# Patient Record
Sex: FEMALE | URBAN - METROPOLITAN AREA
[De-identification: names, ages, dates, MRNs, and addresses within clinical notes are randomized per-mention and may not be internally consistent; named-entity substitution may affect disease eponyms.]

---

## 2024-04-09 ENCOUNTER — OV CON (OUTPATIENT)
Dept: URBAN - METROPOLITAN AREA CLINIC 19 | Facility: CLINIC | Age: 44
End: 2024-04-09

## 2024-07-19 ENCOUNTER — OFFICE VISIT (OUTPATIENT)
Dept: URBAN - METROPOLITAN AREA CLINIC 19 | Facility: CLINIC | Age: 44
End: 2024-07-19
Payer: COMMERCIAL

## 2024-07-19 ENCOUNTER — DASHBOARD ENCOUNTERS (OUTPATIENT)
Age: 44
End: 2024-07-19

## 2024-07-19 VITALS
HEIGHT: 64 IN | DIASTOLIC BLOOD PRESSURE: 96 MMHG | WEIGHT: 121.8 LBS | HEART RATE: 99 BPM | SYSTOLIC BLOOD PRESSURE: 130 MMHG | TEMPERATURE: 98.6 F | BODY MASS INDEX: 20.79 KG/M2

## 2024-07-19 DIAGNOSIS — R10.13 EPIGASTRIC PAIN: ICD-10-CM

## 2024-07-19 DIAGNOSIS — K92.1 BLACK STOOL: ICD-10-CM

## 2024-07-19 DIAGNOSIS — R63.0 LOSS OF APPETITE: ICD-10-CM

## 2024-07-19 DIAGNOSIS — R11.10 VOMITING: ICD-10-CM

## 2024-07-19 PROCEDURE — 99244 OFF/OP CNSLTJ NEW/EST MOD 40: CPT | Performed by: NURSE PRACTITIONER

## 2024-07-19 PROCEDURE — 99204 OFFICE O/P NEW MOD 45 MIN: CPT | Performed by: NURSE PRACTITIONER

## 2024-07-19 RX ORDER — PANTOPRAZOLE SODIUM 40 MG/1
1 TABLET TABLET, DELAYED RELEASE ORAL ONCE A DAY
Qty: 90 TABLET | Refills: 0 | OUTPATIENT
Start: 2024-07-19

## 2024-07-19 RX ORDER — ONDANSETRON 4 MG/1
1 TABLET ON THE TONGUE AND ALLOW TO DISSOLVE TABLET, ORALLY DISINTEGRATING ORAL TWICE A DAY
Qty: 60 TABLET | Refills: 1 | OUTPATIENT
Start: 2024-07-19

## 2024-07-19 NOTE — HPI-TODAY'S VISIT:
42 yo female with PMH of asthma, ADHD, anxiety, alcohol abuse presents for abdominal pain, loss of appetite.  Sent upon referral from Judy Kidd NP. A copy of this report will be sent to the referring provider. Referred to Dr. Stephens   Presented to ER 6/29/2024 with chest pain. Reported vomiting and felt lightheaded like she was going to pass out along with chest pain. She reported last alcohol was day prior, red wine and vomited that up. Denies cardiac history.  reported she has withdrawal symptoms like tremors when she does not drink alcohol.   She reported drinking 3 glasses of wine a day WBC 14.4, Hg 14.8, Plt 177, low sodium 134, K 3.3, T bili 1.0, , AST 74, ALT 41, lipase 92, negative troponin urine preg negative alcohol level of 41 EKG nonischemia, tachycardic  CT A/P with contrast - wall thickening vs underdistention of the ascening colon which could reflect mild colitis. What appears to be a surgical clip along the posterior aspect of the distal stomach/peripyloric region which may reflect migrated cholecystectomy clip. No evidence of acute pancreatitis.  CXR was negative  Given scripts for norco, protonix, phenergan   She reports past 2 years, loss of appetite. Even the thought of chewing and eating makes her feel like she is going to gag. Off and on stomach pain and back pain. BMs are #4-7, reports can be up to 3 times/day. Has had black stools intermittently. No bright red blood in stool.  She reports her Father had cancer but does not know what kind.  Still drinks wine daily but states she is "dropping down." Was starting to drink [place] during the day and wine at night. Feels shaky but has been ongoing past few years. Takes Ibuprofen PRN about twice a month.

## 2024-07-26 ENCOUNTER — OFFICE VISIT (OUTPATIENT)
Dept: URBAN - METROPOLITAN AREA SURGERY CENTER 31 | Facility: SURGERY CENTER | Age: 44
End: 2024-07-26